# Patient Record
Sex: FEMALE | Race: BLACK OR AFRICAN AMERICAN | NOT HISPANIC OR LATINO | Employment: FULL TIME | URBAN - METROPOLITAN AREA
[De-identification: names, ages, dates, MRNs, and addresses within clinical notes are randomized per-mention and may not be internally consistent; named-entity substitution may affect disease eponyms.]

---

## 2024-02-10 ENCOUNTER — HOSPITAL ENCOUNTER (EMERGENCY)
Facility: CLINIC | Age: 29
Discharge: HOME | End: 2024-02-11
Attending: EMERGENCY MEDICINE | Admitting: EMERGENCY MEDICINE

## 2024-02-10 DIAGNOSIS — F10.929 ALCOHOLIC INTOXICATION WITH COMPLICATION (H): ICD-10-CM

## 2024-02-10 PROCEDURE — 85025 COMPLETE CBC W/AUTO DIFF WBC: CPT | Performed by: EMERGENCY MEDICINE

## 2024-02-10 PROCEDURE — 82077 ASSAY SPEC XCP UR&BREATH IA: CPT | Performed by: EMERGENCY MEDICINE

## 2024-02-10 PROCEDURE — 96374 THER/PROPH/DIAG INJ IV PUSH: CPT

## 2024-02-10 PROCEDURE — 36415 COLL VENOUS BLD VENIPUNCTURE: CPT | Performed by: EMERGENCY MEDICINE

## 2024-02-10 PROCEDURE — 99285 EMERGENCY DEPT VISIT HI MDM: CPT | Mod: 25

## 2024-02-10 PROCEDURE — 80053 COMPREHEN METABOLIC PANEL: CPT | Performed by: EMERGENCY MEDICINE

## 2024-02-11 ENCOUNTER — APPOINTMENT (OUTPATIENT)
Dept: CT IMAGING | Facility: CLINIC | Age: 29
End: 2024-02-11
Attending: EMERGENCY MEDICINE

## 2024-02-11 VITALS
RESPIRATION RATE: 20 BRPM | SYSTOLIC BLOOD PRESSURE: 102 MMHG | DIASTOLIC BLOOD PRESSURE: 59 MMHG | TEMPERATURE: 97.6 F | HEART RATE: 75 BPM | OXYGEN SATURATION: 100 %

## 2024-02-11 LAB
ALBUMIN SERPL BCG-MCNC: 4.5 G/DL (ref 3.5–5.2)
ALP SERPL-CCNC: 75 U/L (ref 40–150)
ALT SERPL W P-5'-P-CCNC: 21 U/L (ref 0–50)
ANION GAP SERPL CALCULATED.3IONS-SCNC: 15 MMOL/L (ref 7–15)
AST SERPL W P-5'-P-CCNC: 30 U/L (ref 0–45)
BASOPHILS # BLD AUTO: 0 10E3/UL (ref 0–0.2)
BASOPHILS NFR BLD AUTO: 0 %
BILIRUB SERPL-MCNC: <0.2 MG/DL
BUN SERPL-MCNC: 8.7 MG/DL (ref 6–20)
CALCIUM SERPL-MCNC: 9 MG/DL (ref 8.6–10)
CHLORIDE SERPL-SCNC: 104 MMOL/L (ref 98–107)
CREAT SERPL-MCNC: 0.97 MG/DL (ref 0.51–0.95)
DEPRECATED HCO3 PLAS-SCNC: 23 MMOL/L (ref 22–29)
EGFRCR SERPLBLD CKD-EPI 2021: 80 ML/MIN/1.73M2
EOSINOPHIL # BLD AUTO: 0.2 10E3/UL (ref 0–0.7)
EOSINOPHIL NFR BLD AUTO: 2 %
ERYTHROCYTE [DISTWIDTH] IN BLOOD BY AUTOMATED COUNT: 12 % (ref 10–15)
ETHANOL SERPL-MCNC: 0.22 G/DL
GLUCOSE SERPL-MCNC: 154 MG/DL (ref 70–99)
HCT VFR BLD AUTO: 39.4 % (ref 35–47)
HGB BLD-MCNC: 13.3 G/DL (ref 11.7–15.7)
IMM GRANULOCYTES # BLD: 0 10E3/UL
IMM GRANULOCYTES NFR BLD: 0 %
LYMPHOCYTES # BLD AUTO: 4.6 10E3/UL (ref 0.8–5.3)
LYMPHOCYTES NFR BLD AUTO: 47 %
MCH RBC QN AUTO: 29.6 PG (ref 26.5–33)
MCHC RBC AUTO-ENTMCNC: 33.8 G/DL (ref 31.5–36.5)
MCV RBC AUTO: 88 FL (ref 78–100)
MONOCYTES # BLD AUTO: 0.6 10E3/UL (ref 0–1.3)
MONOCYTES NFR BLD AUTO: 6 %
NEUTROPHILS # BLD AUTO: 4.5 10E3/UL (ref 1.6–8.3)
NEUTROPHILS NFR BLD AUTO: 45 %
NRBC # BLD AUTO: 0 10E3/UL
NRBC BLD AUTO-RTO: 0 /100
PLATELET # BLD AUTO: 272 10E3/UL (ref 150–450)
POTASSIUM SERPL-SCNC: 3.7 MMOL/L (ref 3.4–5.3)
PROT SERPL-MCNC: 7.8 G/DL (ref 6.4–8.3)
RBC # BLD AUTO: 4.49 10E6/UL (ref 3.8–5.2)
SODIUM SERPL-SCNC: 142 MMOL/L (ref 135–145)
WBC # BLD AUTO: 10 10E3/UL (ref 4–11)

## 2024-02-11 PROCEDURE — 72125 CT NECK SPINE W/O DYE: CPT

## 2024-02-11 PROCEDURE — 250N000011 HC RX IP 250 OP 636: Performed by: EMERGENCY MEDICINE

## 2024-02-11 PROCEDURE — 70450 CT HEAD/BRAIN W/O DYE: CPT

## 2024-02-11 RX ORDER — ONDANSETRON 2 MG/ML
4 INJECTION INTRAMUSCULAR; INTRAVENOUS ONCE
Status: COMPLETED | OUTPATIENT
Start: 2024-02-11 | End: 2024-02-11

## 2024-02-11 RX ADMIN — ONDANSETRON 4 MG: 2 INJECTION INTRAMUSCULAR; INTRAVENOUS at 00:04

## 2024-02-11 ASSESSMENT — ACTIVITIES OF DAILY LIVING (ADL)
ADLS_ACUITY_SCORE: 35

## 2024-02-11 NOTE — DISCHARGE INSTRUCTIONS

## 2024-02-11 NOTE — ED NOTES
Bed: ST03  Expected date:   Expected time:   Means of arrival:   Comments:  [age]F Unconscious, ETOH, fall in the bathroom, head strike

## 2024-02-11 NOTE — ED PROVIDER NOTES
History     Chief Complaint:  Alcohol Intoxication       The history is provided by the patient and the EMS personnel.      Funmi Fan is a 28 year old female who presents due to alcohol intoxication. EMS reports that the patient was found unresponsive by a bystander while at the [facility]. Upon EMS arrival, the patient was still unresponsive but would rouse to certain stimuli. EMS states that it is unsure whether the patient had hit her head. She experienced one episode of vomiting. EMS states that security was informed by a friend that she had consumed at least two shots of [place] prior to episode. Her blood sugar measured 190, had a respiratory rate between 16-18 breaths per minute, and was given 250cc of saline en route. Currently, the patient reports a headache and back pain. She denies any other injury including neck pain, abdominal pain, or extremity pain.     Independent Historian:   EMS - They report primary history as noted above.     Review of External Notes:   No notes reviewed.        Medications:    The patient is not currently taking any prescribed medications.     Past Medical History:    The patient denies a past medical history.     Physical Exam   Patient Vitals for the past 24 hrs:   BP Temp Temp src Pulse Resp SpO2   02/11/24 0515 104/73 -- -- 73 10 100 %   02/11/24 0500 97/60 -- -- 77 13 --   02/11/24 0430 95/50 -- -- 79 16 --   02/11/24 0413 94/62 -- -- 80 17 --   02/11/24 0333 115/77 -- -- 76 16 99 %   02/11/24 0313 114/82 -- -- 79 25 99 %   02/11/24 0130 118/75 -- -- 73 13 99 %   02/11/24 0120 118/73 -- -- 76 16 98 %   02/11/24 0115 -- -- -- 73 14 98 %   02/11/24 0100 -- -- -- 75 16 99 %   02/11/24 0045 -- -- -- 68 14 98 %   02/11/24 0030 110/76 -- -- -- -- --   02/11/24 0003 108/70 -- -- -- -- --   02/11/24 0001 112/76 (!) 96  F (35.6  C) Temporal 71 16 --   02/10/24 4192 -- -- -- 69 -- 98 %        Physical Exam  General: Resting on the gurney, appears comfortable. Responds  to verbal stimuli.   Head:  The scalp, face, and head appear normal.  No evidence of injury   Mouth/Throat: Mucus membranes are moist  CV:  Regular rate    Normal S1 and S2  No pathological murmur   Resp:  Breath sounds clear and equal bilaterally    Non-labored, no retractions or accessory muscle use    No coarseness    No wheezing   GI:  Abdomen is soft, no rigidity    No tenderness to palpation  MS:  Moves all extremities without difficulty   Neck:   No midline tenderness to palpation.   Back:   No midline tenderness to palpation.   Skin:  No rash or lesions noted.  Neuro:   Moves all extremities equally, symmetrical facial movements  Psych: Sleeping but rousable    Emergency Department Course     Imaging:  CT Head w/o Contrast   Final Result   IMPRESSION:   HEAD CT:   1.  Normal head CT.      CERVICAL SPINE CT:   1.  No CT evidence for acute fracture or post traumatic subluxation.      CT Cervical Spine w/o Contrast   Final Result   IMPRESSION:   HEAD CT:   1.  Normal head CT.      CERVICAL SPINE CT:   1.  No CT evidence for acute fracture or post traumatic subluxation.      Report per radiology.        Laboratory:  Labs Ordered and Resulted from Time of ED Arrival to Time of ED Departure   COMPREHENSIVE METABOLIC PANEL - Abnormal       Result Value    Sodium 142      Potassium 3.7      Carbon Dioxide (CO2) 23      Anion Gap 15      Urea Nitrogen 8.7      Creatinine 0.97 (*)     GFR Estimate 80      Calcium 9.0      Chloride 104      Glucose 154 (*)     Alkaline Phosphatase 75      AST 30      ALT 21      Protein Total 7.8      Albumin 4.5      Bilirubin Total <0.2     ETHYL ALCOHOL LEVEL - Abnormal    Alcohol ethyl 0.22 (*)    CBC WITH PLATELETS AND DIFFERENTIAL    WBC Count 10.0      RBC Count 4.49      Hemoglobin 13.3      Hematocrit 39.4      MCV 88      MCH 29.6      MCHC 33.8      RDW 12.0      Platelet Count 272      % Neutrophils 45      % Lymphocytes 47      % Monocytes 6      % Eosinophils 2      %  Basophils 0      % Immature Granulocytes 0      NRBCs per 100 WBC 0      Absolute Neutrophils 4.5      Absolute Lymphocytes 4.6      Absolute Monocytes 0.6      Absolute Eosinophils 0.2      Absolute Basophils 0.0      Absolute Immature Granulocytes 0.0      Absolute NRBCs 0.0       Emergency Department Course & Assessments:       Interventions:  Medications   ondansetron (ZOFRAN) injection 4 mg (4 mg Intravenous $Given 2/11/24 0004)        Assessments:  2350 I obtained history and examined the patient as noted above.   0425 I rechecked the patient. We discussed     Independent Interpretation (X-rays, CTs, rhythm strip):  I independently interpreted the patient's head CT images and noted no large volume intracranial hemorrhage.      Consultations/Discussion of Management or Tests:  None      Disposition:  The patient was discharged.     Impression & Plan    CMS Diagnoses: None    Medical Decision Making:  Funmi Fan is a 28 year old female presents after being found unresponsive in a bathroom and brought in by EMS for evaluation and medical clearance. History was noted that the patient had consumed alcohol prior arrival. Trauma exam is normal. Exam is consistent with isolated alcohol intoxication and the patient's mental status and ability to walk steadily improved with time. CT imaging was obtained that was negative. After a period of close monitoring in the ED, I now consider the patient clinically sober such that discharge is safe. Patient denies any thoughts of self harm.  The patient declined my help with alcohol abuse resources. The patient can re-present to the ED for acute problems and I specifically discussed the risk for alcohol withdrawal, but there is no evidence of such at this time.    Diagnosis:    ICD-10-CM    1. Alcoholic intoxication with complication (H24)  F10.929            Scribe Disclosure:  I, Beatrice Callejas, am serving as a scribe at 11:58 PM on 2/10/2024 to document services personally  performed by Robyn Hernandez MD based on my observations and the provider's statements to me.     2/10/2024   Robyn Hernandez MD Taxman, Karah M, MD  02/11/24 0608

## 2024-02-11 NOTE — ED NOTES
Pt was able to ambulate down the [place] and back without any complaints of dizziness, feeling lightheaded, pain or SOB. Pt stated that she felt comfortable walking around on her own.

## 2024-02-11 NOTE — ED NOTES
Pt arouses to voice and answers questions.  States she was partying with her siblings.  Also states she has no idea where she is.  reoriented

## 2024-02-11 NOTE — ED TRIAGE NOTES
Pt found unconscious in [facility] bathroom - pt brought in by ems with c-collar. Ems reports minimally responsive. Fingerstick 190. Friend reports pt had two shots tonight